# Patient Record
Sex: MALE | Race: OTHER | HISPANIC OR LATINO
[De-identification: names, ages, dates, MRNs, and addresses within clinical notes are randomized per-mention and may not be internally consistent; named-entity substitution may affect disease eponyms.]

---

## 2024-03-12 ENCOUNTER — NON-APPOINTMENT (OUTPATIENT)
Age: 19
End: 2024-03-12

## 2024-03-12 ENCOUNTER — APPOINTMENT (OUTPATIENT)
Dept: INTERNAL MEDICINE | Facility: CLINIC | Age: 19
End: 2024-03-12
Payer: COMMERCIAL

## 2024-03-12 VITALS
HEIGHT: 65 IN | RESPIRATION RATE: 16 BRPM | DIASTOLIC BLOOD PRESSURE: 78 MMHG | BODY MASS INDEX: 25.33 KG/M2 | SYSTOLIC BLOOD PRESSURE: 118 MMHG | HEART RATE: 89 BPM | WEIGHT: 152 LBS

## 2024-03-12 DIAGNOSIS — Z00.00 ENCOUNTER FOR GENERAL ADULT MEDICAL EXAMINATION W/OUT ABNORMAL FINDINGS: ICD-10-CM

## 2024-03-12 DIAGNOSIS — Z11.3 ENCOUNTER FOR SCREENING FOR INFECTIONS WITH A PREDOMINANTLY SEXUAL MODE OF TRANSMISSION: ICD-10-CM

## 2024-03-12 DIAGNOSIS — R21 RASH AND OTHER NONSPECIFIC SKIN ERUPTION: ICD-10-CM

## 2024-03-12 DIAGNOSIS — Z11.59 ENCOUNTER FOR SCREENING FOR OTHER VIRAL DISEASES: ICD-10-CM

## 2024-03-12 PROCEDURE — 99203 OFFICE O/P NEW LOW 30 MIN: CPT | Mod: 25

## 2024-03-12 PROCEDURE — 99385 PREV VISIT NEW AGE 18-39: CPT

## 2024-03-12 PROCEDURE — 36415 COLL VENOUS BLD VENIPUNCTURE: CPT

## 2024-03-12 RX ORDER — PREDNISONE 20 MG/1
20 TABLET ORAL DAILY
Qty: 5 | Refills: 0 | Status: ACTIVE | COMMUNITY
Start: 2024-03-12 | End: 1900-01-01

## 2024-03-12 NOTE — HEALTH RISK ASSESSMENT
[No] : In the past 12 months have you used drugs other than those required for medical reasons? No [0] : 1) Little interest or pleasure doing things: Not at all (0) [1] : 2) Feeling down, depressed, or hopeless for several days (1) [PHQ-2 Negative - No further assessment needed] : PHQ-2 Negative - No further assessment needed [Hepatitis C test offered] : Hepatitis C test offered [HIV Test offered] : HIV Test offered [Never] : Never [[id number]] : 1

## 2024-03-12 NOTE — PLAN
WDL WDL WDL [FreeTextEntry1] : In regards annual physical exam: Advised to use sunscreen. Ordering CBC, CMP, Hep C, HIV as per screening guidelines Depression screen: PHQ-2 test done, < 2 as noted above AUDIT-C test done, negative as noted above Advised to see optometrist once a year and dentist every 6 months.  Sin rash. Could be eczema vs others. Start prednisone for 5 days. Referring to allergist  Has a dermatology, advised to follow up with them. Start Claritin or Zyrtec 10 mg QD, OTC  Return to care: within 3 months for follow up or sooner should the need arise. Call or return for any questions.

## 2024-03-12 NOTE — PHYSICAL EXAM
[No Acute Distress] : no acute distress [Well Nourished] : well nourished [Well-Appearing] : well-appearing [Well Developed] : well developed [Normal Voice/Communication] : normal voice/communication [Normal Sclera/Conjunctiva] : normal sclera/conjunctiva [EOMI] : extraocular movements intact [PERRL] : pupils equal round and reactive to light [Normal Outer Ear/Nose] : the outer ears and nose were normal in appearance [No JVD] : no jugular venous distention [Normal Oropharynx] : the oropharynx was normal [No Lymphadenopathy] : no lymphadenopathy [Supple] : supple [Thyroid Normal, No Nodules] : the thyroid was normal and there were no nodules present [No Respiratory Distress] : no respiratory distress  [Clear to Auscultation] : lungs were clear to auscultation bilaterally [No Accessory Muscle Use] : no accessory muscle use [Regular Rhythm] : with a regular rhythm [Normal Rate] : normal rate  [No Murmur] : no murmur heard [Normal S1, S2] : normal S1 and S2 [No Abdominal Bruit] : a ~M bruit was not heard ~T in the abdomen [No Carotid Bruits] : no carotid bruits [No Varicosities] : no varicosities [No Palpable Aorta] : no palpable aorta [No Edema] : there was no peripheral edema [Pedal Pulses Present] : the pedal pulses are present [Soft] : abdomen soft [No Extremity Clubbing/Cyanosis] : no extremity clubbing/cyanosis [Non-distended] : non-distended [Non Tender] : non-tender [No Masses] : no abdominal mass palpated [No HSM] : no HSM [Normal Bowel Sounds] : normal bowel sounds [Urethral Meatus] : meatus normal [Penis Abnormality] : normal circumcised penis [Scrotum] : the scrotum was normal [Normal Posterior Cervical Nodes] : no posterior cervical lymphadenopathy [Testes Tenderness] : no tenderness of the testes [Testes Mass (___cm)] : there were no testicular masses [No CVA Tenderness] : no CVA  tenderness [Normal Anterior Cervical Nodes] : no anterior cervical lymphadenopathy [No Spinal Tenderness] : no spinal tenderness [No Joint Swelling] : no joint swelling [Grossly Normal Strength/Tone] : grossly normal strength/tone [No Rash] : no rash [Coordination Grossly Intact] : coordination grossly intact [No Focal Deficits] : no focal deficits [Deep Tendon Reflexes (DTR)] : deep tendon reflexes were 2+ and symmetric [Normal Gait] : normal gait [Normal Affect] : the affect was normal [Normal Insight/Judgement] : insight and judgment were intact

## 2024-03-12 NOTE — HISTORY OF PRESENT ILLNESS
[FreeTextEntry1] : Establish care [de-identified] : Mr. KRISTOPHER HAZEL is a pleasant 18-year-old male with PMH of anxiety, eczema who comes to the office accompanied by his mother Babita to establish care and his AWE.  HIs eczema has been acting up for about 2 months now with some rash in his arms, chest, face, legs, is itchy but not painful at this time. No other complaints His sister has similar but milder symptoms and has asthma.   Psych: Harman Ruiz Derm: Arianna Kline

## 2024-03-14 LAB
ALBUMIN SERPL ELPH-MCNC: 4.7 G/DL
ALP BLD-CCNC: 76 U/L
ALT SERPL-CCNC: 31 U/L
ANA SER IF-ACNC: NEGATIVE
ANION GAP SERPL CALC-SCNC: 12 MMOL/L
AST SERPL-CCNC: 36 U/L
BASOPHILS # BLD AUTO: 0.06 K/UL
BASOPHILS NFR BLD AUTO: 0.7 %
BILIRUB SERPL-MCNC: 0.2 MG/DL
BUN SERPL-MCNC: 22 MG/DL
C TRACH RRNA SPEC QL NAA+PROBE: NOT DETECTED
CALCIUM SERPL-MCNC: 9.8 MG/DL
CCP AB SER IA-ACNC: 10 UNITS
CHLORIDE SERPL-SCNC: 101 MMOL/L
CO2 SERPL-SCNC: 24 MMOL/L
CREAT SERPL-MCNC: 1.19 MG/DL
EGFR: 90 ML/MIN/1.73M2
EOSINOPHIL # BLD AUTO: 0.6 K/UL
EOSINOPHIL NFR BLD AUTO: 6.9 %
GLUCOSE SERPL-MCNC: 80 MG/DL
HCT VFR BLD CALC: 45.5 %
HCV AB SER QL: NONREACTIVE
HCV S/CO RATIO: 0.15 S/CO
HGB BLD-MCNC: 14.9 G/DL
HIV1+2 AB SPEC QL IA.RAPID: NONREACTIVE
IMM GRANULOCYTES NFR BLD AUTO: 0.2 %
LYMPHOCYTES # BLD AUTO: 1.4 K/UL
LYMPHOCYTES NFR BLD AUTO: 16.2 %
MAN DIFF?: NORMAL
MCHC RBC-ENTMCNC: 28.3 PG
MCHC RBC-ENTMCNC: 32.7 GM/DL
MCV RBC AUTO: 86.3 FL
MONOCYTES # BLD AUTO: 0.8 K/UL
MONOCYTES NFR BLD AUTO: 9.2 %
N GONORRHOEA RRNA SPEC QL NAA+PROBE: NOT DETECTED
NEUTROPHILS # BLD AUTO: 5.77 K/UL
NEUTROPHILS NFR BLD AUTO: 66.8 %
PLATELET # BLD AUTO: 365 K/UL
POTASSIUM SERPL-SCNC: 4.3 MMOL/L
PROT SERPL-MCNC: 7.4 G/DL
RBC # BLD: 5.27 M/UL
RBC # FLD: 13.2 %
RF+CCP IGG SER-IMP: NEGATIVE
SODIUM SERPL-SCNC: 137 MMOL/L
SOURCE AMPLIFICATION: NORMAL
T PALLIDUM AB SER QL IA: NEGATIVE
TSH SERPL-ACNC: 1.4 UIU/ML
URATE SERPL-MCNC: 7.2 MG/DL
WBC # FLD AUTO: 8.65 K/UL

## 2024-05-10 ENCOUNTER — APPOINTMENT (OUTPATIENT)
Dept: INTERNAL MEDICINE | Facility: CLINIC | Age: 19
End: 2024-05-10

## 2024-05-21 ENCOUNTER — LABORATORY RESULT (OUTPATIENT)
Age: 19
End: 2024-05-21

## 2024-05-21 ENCOUNTER — APPOINTMENT (OUTPATIENT)
Dept: PEDIATRIC ALLERGY IMMUNOLOGY | Facility: CLINIC | Age: 19
End: 2024-05-21
Payer: COMMERCIAL

## 2024-05-21 ENCOUNTER — TRANSCRIPTION ENCOUNTER (OUTPATIENT)
Age: 19
End: 2024-05-21

## 2024-05-21 VITALS
OXYGEN SATURATION: 99 % | WEIGHT: 155 LBS | BODY MASS INDEX: 25.83 KG/M2 | TEMPERATURE: 98 F | HEART RATE: 76 BPM | DIASTOLIC BLOOD PRESSURE: 71 MMHG | HEIGHT: 65 IN | SYSTOLIC BLOOD PRESSURE: 106 MMHG

## 2024-05-21 DIAGNOSIS — L20.89 OTHER ATOPIC DERMATITIS: ICD-10-CM

## 2024-05-21 DIAGNOSIS — Z82.5 FAMILY HISTORY OF ASTHMA AND OTHER CHRONIC LOWER RESPIRATORY DISEASES: ICD-10-CM

## 2024-05-21 DIAGNOSIS — Z84.0 FAMILY HISTORY OF DISEASES OF THE SKIN AND SUBCUTANEOUS TISSUE: ICD-10-CM

## 2024-05-21 DIAGNOSIS — J30.2 OTHER SEASONAL ALLERGIC RHINITIS: ICD-10-CM

## 2024-05-21 PROCEDURE — 99204 OFFICE O/P NEW MOD 45 MIN: CPT

## 2024-05-21 RX ORDER — CETIRIZINE HYDROCHLORIDE 10 MG/1
10 TABLET, COATED ORAL
Qty: 30 | Refills: 11 | Status: ACTIVE | COMMUNITY
Start: 2024-05-21 | End: 1900-01-01

## 2024-05-21 RX ORDER — ALCLOMETASONE DIPROPIONATE 0.5 MG/G
0.05 CREAM TOPICAL
Qty: 1 | Refills: 2 | Status: ACTIVE | COMMUNITY
Start: 2024-05-21 | End: 1900-01-01

## 2024-05-21 RX ORDER — TRIAMCINOLONE ACETONIDE 1 MG/G
0.1 CREAM TOPICAL TWICE DAILY
Qty: 1 | Refills: 1 | Status: ACTIVE | COMMUNITY
Start: 2024-05-21 | End: 1900-01-01

## 2024-05-21 NOTE — REASON FOR VISIT
[Evaluation/Consultation] : an evaluation/consultation of [Allergic Rhinitis] : allergic rhinitis [Eczema] : eczema [Parent] : parent

## 2024-05-21 NOTE — HISTORY OF PRESENT ILLNESS
[de-identified] : In office to be evaluated for severe atopic dermatitis and allergies. Atopic dermatitis: Onset around the age of 11.  In care of dermatologist, but not seen recently (last visit about 1 year ago).  Had topical creams in the past but not recently.  He had significant flare of atopic dermatitis in January and February, now slightly better but still with widespread rash.  Itching is increased when he feels hot.  He tried to improve the eczema by using moisturizers regularly and eating healthy.  He avoids processed foods, which seem to be worsening the eczema. Skin care consists of daily showers for 10 minutes or less (used to take baths twice a day but they were too drying), uses Dove soap or Dr. Brown or soap, tried Cetaphil but it was too drying and now using coconut oil.  Detergent is Tide Free, uses vinegar and softener and no dryer sheets (uses wool balls in the dryer).  Noticed worsening of the eczema with processed foods especially macaroni with tuna salad and increased temperature in the bedroom on the second floor. Allergies: Sneezing, stuffy nose, occasional itchy eyes, mostly in the spring.  Gets occasional runny nose in winter after being outdoors for a long time.  Takes green tea and raw honey but no medications for allergies.  No chest symptoms and no frequent antibiotics for respiratory infections. Recent blood work from primary care physician in March: CBC with differential with absolute eosinophil count 600, chemistry normal, TSH and uric acid normal, OMEGA and citrulinated peptide antibody negative, screening for STD, hepatitis C and HIV negative.

## 2024-05-21 NOTE — SOCIAL HISTORY
[de-identified] : House with gas baseboard heating, central air conditioning, no carpet in the bedroom, no pets, some feather bedding.  Bedroom is on the second floor and gets hot.

## 2024-05-21 NOTE — PHYSICAL EXAM
[Alert] : alert [No Acute Distress] : no acute distress [Normal Voice/Communication] : normal voice communication [Supple] : the neck was supple [Normal S1, S2] : normal S1 and S2 [No murmur] : no murmur [Regular Rhythm] : with a regular rhythm [Soft] : abdomen soft [Not Tender] : non-tender [No HSM] : no hepato-splenomegaly [Normal Cervical Lymph Nodes] : cervical [No clubbing] : no clubbing [No Cyanosis] : no cyanosis [Alert, Awake, Oriented as Age-Appropriate] : alert, awake, oriented as age appropriate [de-identified] : Eyes clear. [de-identified] : Throat clear.  Nasal mucosa pink, mild stuffy nose, no discharge.  Tympanic membranes obscured by wax.  No sinus tenderness. [de-identified] : Chest clear, good air entry, no wheezing or crackles. [de-identified] : Large red birthmark on the right cheek, with some dryness.  Significant erythema, scaling and patchy lichenification on neck, anterior chest, arms, legs, hands (dorsal aspect).  Patchy hypopigmentation and hyperpigmentation on the back.

## 2024-05-21 NOTE — ASSESSMENT
[FreeTextEntry1] : Atopic dermatitis, severe: Patient wants to improve with natural means.  Explained that he needs to use topical corticosteroids to bring the skin under control and then continue with natural means.  Take daily baths for 20 minutes followed by aggressive moisturizer (CeraVe or Vanicream).  Continue to use hypoallergenic detergent and no dryer sheets.  Trial of triamcinolone cream 0.1% for body, twice daily for 7 to 10 days.  Aclovate cream for face twice daily for 5 to 7 days.  Blood work for environmental allergens and selected foods.  Follow-up in 2 weeks.  If not better, discuss Dupixent.  Dermatologist plans to start Dupixent but the eczema improved and he did not need it.  Also recommended consulting with dermatologist.  Trial of cetirizine 10 mg daily. Seasonal allergic rhinitis: Blood work will check environmental allergies. Follow-up in 2 to 3 weeks.

## 2024-05-23 ENCOUNTER — NON-APPOINTMENT (OUTPATIENT)
Age: 19
End: 2024-05-23

## 2024-05-28 ENCOUNTER — NON-APPOINTMENT (OUTPATIENT)
Age: 19
End: 2024-05-28

## 2024-06-12 ENCOUNTER — APPOINTMENT (OUTPATIENT)
Dept: PEDIATRIC ALLERGY IMMUNOLOGY | Facility: CLINIC | Age: 19
End: 2024-06-12